# Patient Record
Sex: MALE | HISPANIC OR LATINO | Employment: OTHER | URBAN - METROPOLITAN AREA
[De-identification: names, ages, dates, MRNs, and addresses within clinical notes are randomized per-mention and may not be internally consistent; named-entity substitution may affect disease eponyms.]

---

## 2022-05-11 ENCOUNTER — LAB (OUTPATIENT)
Dept: LAB | Facility: HOSPITAL | Age: 62
End: 2022-05-11

## 2022-05-11 ENCOUNTER — OFFICE VISIT (OUTPATIENT)
Dept: INTERNAL MEDICINE | Facility: CLINIC | Age: 62
End: 2022-05-11

## 2022-05-11 VITALS
OXYGEN SATURATION: 98 % | SYSTOLIC BLOOD PRESSURE: 146 MMHG | HEIGHT: 66 IN | DIASTOLIC BLOOD PRESSURE: 86 MMHG | HEART RATE: 90 BPM | WEIGHT: 166.2 LBS | TEMPERATURE: 98 F | BODY MASS INDEX: 26.71 KG/M2

## 2022-05-11 DIAGNOSIS — F41.1 GENERALIZED ANXIETY DISORDER: ICD-10-CM

## 2022-05-11 DIAGNOSIS — R19.4 CHANGE IN BOWEL HABITS: ICD-10-CM

## 2022-05-11 DIAGNOSIS — Z12.5 SCREENING FOR PROSTATE CANCER: ICD-10-CM

## 2022-05-11 DIAGNOSIS — R10.13 EPIGASTRIC PAIN: ICD-10-CM

## 2022-05-11 DIAGNOSIS — Z11.59 ENCOUNTER FOR HEPATITIS C SCREENING TEST FOR LOW RISK PATIENT: ICD-10-CM

## 2022-05-11 DIAGNOSIS — Z86.19 HISTORY OF HELICOBACTER PYLORI INFECTION: ICD-10-CM

## 2022-05-11 DIAGNOSIS — K21.9 GASTROESOPHAGEAL REFLUX DISEASE WITHOUT ESOPHAGITIS: Primary | ICD-10-CM

## 2022-05-11 DIAGNOSIS — R10.32 LEFT LOWER QUADRANT PAIN: ICD-10-CM

## 2022-05-11 LAB
ALBUMIN SERPL-MCNC: 4.5 G/DL (ref 3.5–5.2)
ALBUMIN/GLOB SERPL: 1.8 G/DL
ALP SERPL-CCNC: 51 U/L (ref 39–117)
ALT SERPL W P-5'-P-CCNC: 21 U/L (ref 1–41)
ANION GAP SERPL CALCULATED.3IONS-SCNC: 12.1 MMOL/L (ref 5–15)
AST SERPL-CCNC: 24 U/L (ref 1–40)
BASOPHILS # BLD AUTO: 0.04 10*3/MM3 (ref 0–0.2)
BASOPHILS NFR BLD AUTO: 0.8 % (ref 0–1.5)
BILIRUB SERPL-MCNC: 0.8 MG/DL (ref 0–1.2)
BUN SERPL-MCNC: 12 MG/DL (ref 8–23)
BUN/CREAT SERPL: 13.2 (ref 7–25)
CALCIUM SPEC-SCNC: 9.4 MG/DL (ref 8.6–10.5)
CHLORIDE SERPL-SCNC: 105 MMOL/L (ref 98–107)
CO2 SERPL-SCNC: 23.9 MMOL/L (ref 22–29)
CREAT SERPL-MCNC: 0.91 MG/DL (ref 0.76–1.27)
DEPRECATED RDW RBC AUTO: 42.6 FL (ref 37–54)
EGFRCR SERPLBLD CKD-EPI 2021: 94.7 ML/MIN/1.73
EOSINOPHIL # BLD AUTO: 0.39 10*3/MM3 (ref 0–0.4)
EOSINOPHIL NFR BLD AUTO: 7.6 % (ref 0.3–6.2)
ERYTHROCYTE [DISTWIDTH] IN BLOOD BY AUTOMATED COUNT: 13.1 % (ref 12.3–15.4)
GLOBULIN UR ELPH-MCNC: 2.5 GM/DL
GLUCOSE SERPL-MCNC: 86 MG/DL (ref 65–99)
HCT VFR BLD AUTO: 47.9 % (ref 37.5–51)
HCV AB SER DONR QL: NORMAL
HGB BLD-MCNC: 16.2 G/DL (ref 13–17.7)
IMM GRANULOCYTES # BLD AUTO: 0.01 10*3/MM3 (ref 0–0.05)
IMM GRANULOCYTES NFR BLD AUTO: 0.2 % (ref 0–0.5)
LYMPHOCYTES # BLD AUTO: 1.96 10*3/MM3 (ref 0.7–3.1)
LYMPHOCYTES NFR BLD AUTO: 38.2 % (ref 19.6–45.3)
MCH RBC QN AUTO: 30.2 PG (ref 26.6–33)
MCHC RBC AUTO-ENTMCNC: 33.8 G/DL (ref 31.5–35.7)
MCV RBC AUTO: 89.4 FL (ref 79–97)
MONOCYTES # BLD AUTO: 0.49 10*3/MM3 (ref 0.1–0.9)
MONOCYTES NFR BLD AUTO: 9.6 % (ref 5–12)
NEUTROPHILS NFR BLD AUTO: 2.24 10*3/MM3 (ref 1.7–7)
NEUTROPHILS NFR BLD AUTO: 43.6 % (ref 42.7–76)
NRBC BLD AUTO-RTO: 0 /100 WBC (ref 0–0.2)
PLATELET # BLD AUTO: 198 10*3/MM3 (ref 140–450)
PMV BLD AUTO: 10 FL (ref 6–12)
POTASSIUM SERPL-SCNC: 4.2 MMOL/L (ref 3.5–5.2)
PROT SERPL-MCNC: 7 G/DL (ref 6–8.5)
PSA SERPL-MCNC: 5.01 NG/ML (ref 0–4)
RBC # BLD AUTO: 5.36 10*6/MM3 (ref 4.14–5.8)
SODIUM SERPL-SCNC: 141 MMOL/L (ref 136–145)
WBC NRBC COR # BLD: 5.13 10*3/MM3 (ref 3.4–10.8)

## 2022-05-11 PROCEDURE — 80053 COMPREHEN METABOLIC PANEL: CPT | Performed by: NURSE PRACTITIONER

## 2022-05-11 PROCEDURE — G0103 PSA SCREENING: HCPCS | Performed by: NURSE PRACTITIONER

## 2022-05-11 PROCEDURE — 36415 COLL VENOUS BLD VENIPUNCTURE: CPT | Performed by: NURSE PRACTITIONER

## 2022-05-11 PROCEDURE — 86803 HEPATITIS C AB TEST: CPT | Performed by: NURSE PRACTITIONER

## 2022-05-11 PROCEDURE — 85025 COMPLETE CBC W/AUTO DIFF WBC: CPT | Performed by: NURSE PRACTITIONER

## 2022-05-11 PROCEDURE — 99204 OFFICE O/P NEW MOD 45 MIN: CPT | Performed by: NURSE PRACTITIONER

## 2022-05-11 PROCEDURE — 83013 H PYLORI (C-13) BREATH: CPT | Performed by: NURSE PRACTITIONER

## 2022-05-11 RX ORDER — CLONAZEPAM 1 MG/1
1 TABLET ORAL DAILY
Qty: 30 TABLET | Refills: 0 | Status: SHIPPED | OUTPATIENT
Start: 2022-05-11 | End: 2022-06-20 | Stop reason: SDUPTHER

## 2022-05-11 RX ORDER — OMEPRAZOLE 20 MG/1
20 CAPSULE, DELAYED RELEASE ORAL DAILY
Qty: 30 CAPSULE | Refills: 3 | Status: SHIPPED | OUTPATIENT
Start: 2022-05-11 | End: 2022-05-20

## 2022-05-11 RX ORDER — CLONAZEPAM 1 MG/1
1 TABLET ORAL 2 TIMES DAILY PRN
COMMUNITY
End: 2022-05-11 | Stop reason: SDUPTHER

## 2022-05-11 NOTE — PROGRESS NOTES
"Subjective   Chief Complaint   Patient presents with   • Establish Care   • Heartburn       Jozef Dillon is a 61 y.o. male here today as a new patient to establish care.  Recently moved to [place] from [place].  Pt states that he has not had a PCP for a long time.  Pt is currently on Clonazepam.  He takes it BID.  He states he's been on the medication for about 2 years.  He feels really anxious.  He states he tried a different medication (he thinks for depression) but it messed up his blood pressure so he was changed to Clonazepam.  He was seeing a psychiatrist in [place].  Pt complains of abdominal pain and acid reflux.  This has been ongoing for about 3 months.  He states this occurs \"all the time\", worse with eating.  He wakes up with the acid in his stomach during the night.  He has tried OTC antacids.  Sometimes he vomits due to the acid.  He states his stomach swells.  He was treated for H. Pylori about 5 months ago.  He states he took treatment for about 6 weeks.  He has never seen GI.  He's had the symptoms ever since he was treated for H. Pylori.  The abdominal pain is in his left lower quadrant and is constant, the epigastric pain occurs only with eating.    Review of Systems   Constitutional: Negative for activity change, appetite change and fatigue.   HENT: Negative for congestion.    Respiratory: Negative for cough and shortness of breath.    Cardiovascular: Negative for chest pain and leg swelling.   Gastrointestinal: Positive for abdominal pain, nausea and GERD. Negative for blood in stool, constipation and diarrhea.   Neurological: Negative for dizziness, weakness and confusion.   Psychiatric/Behavioral: Negative for behavioral problems and decreased concentration.       Past Medical History:   Diagnosis Date   • Anxiety    • Arthritis    • Cancer (HCC)    • Depression    • Gallstone    • GERD (gastroesophageal reflux disease)    • Glaucoma    • Peptic ulceration    • Skin cancer      Past Surgical " "History:   Procedure Laterality Date   • CHOLECYSTECTOMY       Family History   Problem Relation Age of Onset   • Cancer Father      Social History     Tobacco Use   Smoking Status Current Every Day Smoker   • Packs/day: 0.25   • Years: 10.00   • Pack years: 2.50   Smokeless Tobacco Never Used      Social History     Substance and Sexual Activity   Alcohol Use Not Currently      Current Outpatient Medications on File Prior to Visit   Medication Sig   • [DISCONTINUED] clonazePAM (KlonoPIN) 1 MG tablet Take 1 mg by mouth 2 (Two) Times a Day As Needed.     No current facility-administered medications on file prior to visit.     Allergies   Allergen Reactions   • Codeine GI Intolerance       Objective   Vitals:    05/11/22 0953   BP: 146/86   Pulse: 90   Temp: 98 °F (36.7 °C)   TempSrc: Temporal   SpO2: 98%   Weight: 75.4 kg (166 lb 3.2 oz)   Height: 167.6 cm (66\")     Body mass index is 26.83 kg/m².    Physical Exam  Vitals and nursing note reviewed.   HENT:      Head: Normocephalic.   Eyes:      Pupils: Pupils are equal, round, and reactive to light.   Cardiovascular:      Rate and Rhythm: Normal rate and regular rhythm.      Pulses: Normal pulses.      Heart sounds: Normal heart sounds.   Pulmonary:      Effort: Pulmonary effort is normal.      Breath sounds: Normal breath sounds.   Abdominal:      General: Bowel sounds are increased. There is no distension.      Palpations: Abdomen is soft.      Tenderness: There is abdominal tenderness in the left lower quadrant. There is no guarding or rebound.   Skin:     General: Skin is warm and dry.      Capillary Refill: Capillary refill takes less than 2 seconds.   Neurological:      General: No focal deficit present.      Mental Status: He is alert and oriented to person, place, and time.      Gait: Gait is intact.   Psychiatric:         Attention and Perception: Attention normal.         Mood and Affect: Mood normal.         Behavior: Behavior normal.         Thought " Content: Thought content normal.         Judgment: Judgment normal.         Assessment & Plan   Problem List Items Addressed This Visit    None     Visit Diagnoses     Gastroesophageal reflux disease without esophagitis    -  Primary    Relevant Medications    omeprazole (priLOSEC) 20 MG capsule    Other Relevant Orders    CBC w AUTO Differential    Comprehensive Metabolic Panel    H. Pylori Breath Test - Breath, Lung    Epigastric pain        Relevant Orders    H. Pylori Breath Test - Breath, Lung    History of Helicobacter pylori infection        Encounter for hepatitis C screening test for low risk patient        Relevant Orders    Hepatitis C Antibody    Screening for prostate cancer        Relevant Orders    PSA Screen    Change in bowel habits        Generalized anxiety disorder        Relevant Medications    clonazePAM (KlonoPIN) 1 MG tablet    Other Relevant Orders    Ambulatory Referral to Behavioral Health (Completed)    Left lower quadrant pain             Decrease Clonazepam to once daily, discussed with pt psych likely would want him to taper off, he is in agreement  Refer to psych per pt request for anxiety  Labs and check for H/ Pylori  Will need referral to GI for EGD/colonoscopy after lab results reviewed    Current Outpatient Medications:   •  clonazePAM (KlonoPIN) 1 MG tablet, Take 1 tablet by mouth Daily., Disp: 30 tablet, Rfl: 0  •  omeprazole (priLOSEC) 20 MG capsule, Take 1 capsule by mouth Daily., Disp: 30 capsule, Rfl: 3       Plan of care reviewed with the patient at the conclusion of today's visit.  Education was provided regarding diagnosis, management, and any prescribed or recommended OTC medications.  Patient verbalized understanding of and agreement with management plan.     Return in about 3 weeks (around 6/1/2022) for Recheck Acid Reflux .      DENZEL Dejesus

## 2022-05-12 LAB — UREA BREATH TEST QL: NEGATIVE

## 2022-05-13 ENCOUNTER — TELEPHONE (OUTPATIENT)
Dept: INTERNAL MEDICINE | Facility: CLINIC | Age: 62
End: 2022-05-13

## 2022-05-13 DIAGNOSIS — Z12.11 SCREENING FOR COLON CANCER: ICD-10-CM

## 2022-05-13 DIAGNOSIS — R10.13 EPIGASTRIC PAIN: ICD-10-CM

## 2022-05-13 DIAGNOSIS — K21.9 GASTROESOPHAGEAL REFLUX DISEASE WITHOUT ESOPHAGITIS: Primary | ICD-10-CM

## 2022-05-13 DIAGNOSIS — R19.4 CHANGE IN BOWEL HABITS: ICD-10-CM

## 2022-05-20 ENCOUNTER — OFFICE VISIT (OUTPATIENT)
Dept: GASTROENTEROLOGY | Facility: CLINIC | Age: 62
End: 2022-05-20

## 2022-05-20 VITALS — BODY MASS INDEX: 26.52 KG/M2 | WEIGHT: 165 LBS | TEMPERATURE: 97.7 F | HEIGHT: 66 IN

## 2022-05-20 DIAGNOSIS — R11.0 NAUSEA: ICD-10-CM

## 2022-05-20 DIAGNOSIS — Z01.812 ENCOUNTER FOR PREPROCEDURE SCREENING LABORATORY TESTING FOR COVID-19: Primary | ICD-10-CM

## 2022-05-20 DIAGNOSIS — R10.13 EPIGASTRIC PAIN: Primary | ICD-10-CM

## 2022-05-20 DIAGNOSIS — Z20.822 ENCOUNTER FOR PREPROCEDURE SCREENING LABORATORY TESTING FOR COVID-19: Primary | ICD-10-CM

## 2022-05-20 PROCEDURE — 99214 OFFICE O/P EST MOD 30 MIN: CPT | Performed by: NURSE PRACTITIONER

## 2022-05-20 RX ORDER — OMEPRAZOLE 40 MG/1
40 CAPSULE, DELAYED RELEASE ORAL DAILY
Qty: 90 CAPSULE | Refills: 3 | Status: SHIPPED | OUTPATIENT
Start: 2022-05-20 | End: 2022-06-30 | Stop reason: SDUPTHER

## 2022-05-20 RX ORDER — SODIUM, POTASSIUM,MAG SULFATES 17.5-3.13G
2 SOLUTION, RECONSTITUTED, ORAL ORAL TAKE AS DIRECTED
Qty: 354 ML | Refills: 0 | Status: SHIPPED | OUTPATIENT
Start: 2022-05-20 | End: 2022-05-25

## 2022-05-20 NOTE — PROGRESS NOTES
GASTROENTEROLOGY OFFICE NOTE  Jozef Sorto  [id number]  1960    CARE TEAM  Patient Care Team:  Sachin Fuentes APRN as PCP - General (Family Medicine)    Referring Provider: Sachin Fuentes APRN    Chief Complaint   Patient presents with   • Abdominal Pain   • Nausea   • Heartburn   • Diarrhea        HISTORY OF PRESENT ILLNESS:  Patient is a 61-year-old male who presents today with complaints of abdominal pain and epigastric pain described as a burning and rumbling type feeling.  This pain is worse with any type of spicy or citric foods.  He has frequent nausea, usually in the morning when he wakes up is the worst time for his nausea.  He only has vomiting after eating spicy foods.  He also has some loose stools associated eating spicy foods.    His symptoms began back in January.  He was diagnosed with H. pylori and treated.  Recent breath test confirms eradication of H. pylori.  He reports that while he was on treatment for H. pylori his symptoms were somewhat lessened.  After completion of treatment his symptoms worsened, he was started on omeprazole 20 mg daily for 6 weeks.  Again, his symptoms improved somewhat but never completely resolved and since discontinuing the omeprazole his symptoms have worsened.  He resumed Prilosec 20 mg daily about a week ago and notes maybe the smallest amount of improvement.    He has a family history of colon cancer in his father.  He has never had a colonoscopy.  He is scheduled for a colonoscopy with Dr. Alicia on July 18.    PAST MEDICAL HISTORY  Past Medical History:   Diagnosis Date   • Anxiety    • Arthritis    • Cancer (HCC)    • Depression    • Gallstone    • GERD (gastroesophageal reflux disease)    • Glaucoma    • Peptic ulceration    • Skin cancer         PAST SURGICAL HISTORY  Past Surgical History:   Procedure Laterality Date   • CHOLECYSTECTOMY          MEDICATIONS:    Current Outpatient Medications:   •  clonazePAM (KlonoPIN) 1 MG tablet, Take 1  "tablet by mouth Daily., Disp: 30 tablet, Rfl: 0  •  omeprazole (priLOSEC) 40 MG capsule, Take 1 capsule by mouth Daily., Disp: 90 capsule, Rfl: 3    ALLERGIES  Allergies   Allergen Reactions   • Codeine GI Intolerance       FAMILY HISTORY:  Family History   Problem Relation Age of Onset   • Colon cancer Father    • Cancer Father        SOCIAL HISTORY  Social History     Socioeconomic History   • Marital status:    Tobacco Use   • Smoking status: Current Every Day Smoker     Packs/day: 0.25     Years: 10.00     Pack years: 2.50   • Smokeless tobacco: Never Used   Vaping Use   • Vaping Use: Never used   Substance and Sexual Activity   • Alcohol use: Not Currently   • Drug use: Never   • Sexual activity: Defer         PHYSICAL EXAM   Temp 97.7 °F (36.5 °C) (Temporal)   Ht 167.6 cm (66\")   Wt 74.8 kg (165 lb)   BMI 26.63 kg/m²   Physical Exam  Vitals and nursing note reviewed.   Constitutional:       Appearance: Normal appearance. He is well-developed.   HENT:      Head: Normocephalic and atraumatic.      Nose: Nose normal.      Mouth/Throat:      Mouth: Mucous membranes are moist.      Pharynx: Oropharynx is clear.   Eyes:      Pupils: Pupils are equal, round, and reactive to light.   Cardiovascular:      Rate and Rhythm: Normal rate and regular rhythm.   Pulmonary:      Effort: Pulmonary effort is normal.      Breath sounds: Normal breath sounds. No wheezing or rales.   Abdominal:      General: Bowel sounds are normal.      Palpations: Abdomen is soft. There is no mass.      Tenderness: There is abdominal tenderness in the epigastric area and left upper quadrant. There is no guarding or rebound.      Hernia: No hernia is present.   Musculoskeletal:         General: Normal range of motion.      Cervical back: Normal range of motion and neck supple.   Skin:     General: Skin is warm and dry.   Neurological:      Mental Status: He is alert and oriented to person, place, and time.      Cranial Nerves: No cranial " nerve deficit.   Psychiatric:         Behavior: Behavior normal.         Judgment: Judgment normal.           Results Review:  H. pylori Breath Test   Negative Negative          ASSESSMENT / PLAN  1.  Epigastric pain  2.  Nausea  - Increase Prilosec to 40 mg daily  - EGD    Return for Follow up after procedures.    I discussed the patients findings and my recommendations with patient    DENZEL Bailey

## 2022-05-25 RX ORDER — PEG-3350, SODIUM SULFATE, SODIUM CHLORIDE, POTASSIUM CHLORIDE, SODIUM ASCORBATE AND ASCORBIC ACID 7.5-2.691G
KIT ORAL
Qty: 1 EACH | Refills: 0 | Status: SHIPPED | OUTPATIENT
Start: 2022-05-25 | End: 2022-07-01

## 2022-05-27 ENCOUNTER — LAB (OUTPATIENT)
Dept: LAB | Facility: HOSPITAL | Age: 62
End: 2022-05-27

## 2022-05-27 ENCOUNTER — OFFICE VISIT (OUTPATIENT)
Dept: INTERNAL MEDICINE | Facility: CLINIC | Age: 62
End: 2022-05-27

## 2022-05-27 VITALS
HEIGHT: 66 IN | DIASTOLIC BLOOD PRESSURE: 92 MMHG | TEMPERATURE: 97.6 F | SYSTOLIC BLOOD PRESSURE: 144 MMHG | OXYGEN SATURATION: 98 % | WEIGHT: 164.8 LBS | HEART RATE: 91 BPM | BODY MASS INDEX: 26.48 KG/M2

## 2022-05-27 DIAGNOSIS — R35.0 URINARY FREQUENCY: ICD-10-CM

## 2022-05-27 DIAGNOSIS — R97.20 ELEVATED PSA: ICD-10-CM

## 2022-05-27 DIAGNOSIS — I10 ESSENTIAL HYPERTENSION: ICD-10-CM

## 2022-05-27 DIAGNOSIS — K21.9 GASTROESOPHAGEAL REFLUX DISEASE WITHOUT ESOPHAGITIS: Primary | ICD-10-CM

## 2022-05-27 LAB
CHOLEST SERPL-MCNC: 155 MG/DL (ref 0–200)
HDLC SERPL-MCNC: 49 MG/DL (ref 40–60)
LDLC SERPL CALC-MCNC: 87 MG/DL (ref 0–100)
LDLC/HDLC SERPL: 1.73 {RATIO}
PSA SERPL-MCNC: 4.61 NG/ML (ref 0–4)
TRIGL SERPL-MCNC: 106 MG/DL (ref 0–150)
VLDLC SERPL-MCNC: 19 MG/DL (ref 5–40)

## 2022-05-27 PROCEDURE — 99214 OFFICE O/P EST MOD 30 MIN: CPT | Performed by: NURSE PRACTITIONER

## 2022-05-27 PROCEDURE — 36415 COLL VENOUS BLD VENIPUNCTURE: CPT | Performed by: NURSE PRACTITIONER

## 2022-05-27 PROCEDURE — 80061 LIPID PANEL: CPT | Performed by: NURSE PRACTITIONER

## 2022-05-27 PROCEDURE — 84153 ASSAY OF PSA TOTAL: CPT | Performed by: NURSE PRACTITIONER

## 2022-05-27 RX ORDER — LISINOPRIL 10 MG/1
10 TABLET ORAL DAILY
Qty: 30 TABLET | Refills: 5 | Status: SHIPPED | OUTPATIENT
Start: 2022-05-27 | End: 2022-07-08

## 2022-05-27 NOTE — PROGRESS NOTES
"Chief Complaint   Patient presents with   • Heartburn     F/u       HPI  Jozef Sorto is a 61 y.o. male presents for follow-up on heartburn.  Current treatment includes Omeprazole.  Pt was tested for H. Pylori at his last appt which is negative.  He has been referred to GI for an EGD.  He has already seen GI and his Omeprazole was increased and he's feeling some better.  He is scheduled for scopes next month.    BP elevated today.  It was noted to be elevated at his last appt as well.  Pt states his BP is always a little elevated.    The following portions of the patient's history were reviewed and updated as appropriate: allergies, current medications, past family history, past medical history, past social history, past surgical history and problem list.    Subjective  Review of Systems   Constitutional: Negative for activity change, appetite change and fatigue.   HENT: Negative for congestion.    Respiratory: Negative for cough and shortness of breath.    Cardiovascular: Negative for chest pain, palpitations and leg swelling.   Gastrointestinal: Negative for abdominal pain, nausea, vomiting and GERD.   Neurological: Negative for dizziness, weakness and confusion.   Psychiatric/Behavioral: Negative for behavioral problems and decreased concentration.       Objective  Visit Vitals  /92   Pulse 91   Temp 97.6 °F (36.4 °C) (Temporal)   Ht 167.6 cm (66\")   Wt 74.8 kg (164 lb 12.8 oz)   SpO2 98%   BMI 26.60 kg/m²        Physical Exam  Vitals and nursing note reviewed.   HENT:      Head: Normocephalic.   Eyes:      Pupils: Pupils are equal, round, and reactive to light.   Cardiovascular:      Rate and Rhythm: Normal rate and regular rhythm.      Pulses: Normal pulses.      Heart sounds: Normal heart sounds. No murmur heard.  Pulmonary:      Effort: Pulmonary effort is normal.      Breath sounds: Normal breath sounds.   Skin:     General: Skin is warm and dry.      Capillary Refill: Capillary refill takes less than " 2 seconds.   Neurological:      General: No focal deficit present.      Mental Status: He is alert and oriented to person, place, and time.      Gait: Gait is intact.   Psychiatric:         Attention and Perception: Attention normal.         Mood and Affect: Mood normal.         Behavior: Behavior normal.          Procedures       Office Visit on 05/11/2022   Component Date Value Ref Range Status   • Glucose 05/11/2022 86  65 - 99 mg/dL Final   • BUN 05/11/2022 12  8 - 23 mg/dL Final   • Creatinine 05/11/2022 0.91  0.76 - 1.27 mg/dL Final   • Sodium 05/11/2022 141  136 - 145 mmol/L Final   • Potassium 05/11/2022 4.2  3.5 - 5.2 mmol/L Final   • Chloride 05/11/2022 105  98 - 107 mmol/L Final   • CO2 05/11/2022 23.9  22.0 - 29.0 mmol/L Final   • Calcium 05/11/2022 9.4  8.6 - 10.5 mg/dL Final   • Total Protein 05/11/2022 7.0  6.0 - 8.5 g/dL Final   • Albumin 05/11/2022 4.50  3.50 - 5.20 g/dL Final   • ALT (SGPT) 05/11/2022 21  1 - 41 U/L Final   • AST (SGOT) 05/11/2022 24  1 - 40 U/L Final   • Alkaline Phosphatase 05/11/2022 51  39 - 117 U/L Final   • Total Bilirubin 05/11/2022 0.8  0.0 - 1.2 mg/dL Final   • Globulin 05/11/2022 2.5  gm/dL Final   • A/G Ratio 05/11/2022 1.8  g/dL Final   • BUN/Creatinine Ratio 05/11/2022 13.2  7.0 - 25.0 Final   • Anion Gap 05/11/2022 12.1  5.0 - 15.0 mmol/L Final   • eGFR 05/11/2022 94.7  >60.0 mL/min/1.73 Final    National Kidney Foundation and American Society of Nephrology (ASN) Task Force recommended calculation based on the Chronic Kidney Disease Epidemiology Collaboration (CKD-EPI) equation refit without adjustment for race.   • H. pylori Breath Test 05/11/2022 Negative  Negative Final   • Hepatitis C Ab 05/11/2022 Non-Reactive  Non-Reactive Final   • PSA 05/11/2022 5.010 (A) 0.000 - 4.000 ng/mL Final   • WBC 05/11/2022 5.13  3.40 - 10.80 10*3/mm3 Final   • RBC 05/11/2022 5.36  4.14 - 5.80 10*6/mm3 Final   • Hemoglobin 05/11/2022 16.2  13.0 - 17.7 g/dL Final   • Hematocrit  05/11/2022 47.9  37.5 - 51.0 % Final   • MCV 05/11/2022 89.4  79.0 - 97.0 fL Final   • MCH 05/11/2022 30.2  26.6 - 33.0 pg Final   • MCHC 05/11/2022 33.8  31.5 - 35.7 g/dL Final   • RDW 05/11/2022 13.1  12.3 - 15.4 % Final   • RDW-SD 05/11/2022 42.6  37.0 - 54.0 fl Final   • MPV 05/11/2022 10.0  6.0 - 12.0 fL Final   • Platelets 05/11/2022 198  140 - 450 10*3/mm3 Final   • Neutrophil % 05/11/2022 43.6  42.7 - 76.0 % Final   • Lymphocyte % 05/11/2022 38.2  19.6 - 45.3 % Final   • Monocyte % 05/11/2022 9.6  5.0 - 12.0 % Final   • Eosinophil % 05/11/2022 7.6 (A) 0.3 - 6.2 % Final   • Basophil % 05/11/2022 0.8  0.0 - 1.5 % Final   • Immature Grans % 05/11/2022 0.2  0.0 - 0.5 % Final   • Neutrophils, Absolute 05/11/2022 2.24  1.70 - 7.00 10*3/mm3 Final   • Lymphocytes, Absolute 05/11/2022 1.96  0.70 - 3.10 10*3/mm3 Final   • Monocytes, Absolute 05/11/2022 0.49  0.10 - 0.90 10*3/mm3 Final   • Eosinophils, Absolute 05/11/2022 0.39  0.00 - 0.40 10*3/mm3 Final   • Basophils, Absolute 05/11/2022 0.04  0.00 - 0.20 10*3/mm3 Final   • Immature Grans, Absolute 05/11/2022 0.01  0.00 - 0.05 10*3/mm3 Final   • nRBC 05/11/2022 0.0  0.0 - 0.2 /100 WBC Final         Assessment and Plan  Diagnoses and all orders for this visit:    1. Gastroesophageal reflux disease without esophagitis (Primary)    2. Elevated PSA  -     PSA DIAGNOSTIC ONLY; Future  -     Ambulatory Referral to Urology  -     PSA DIAGNOSTIC ONLY    3. Urinary frequency  -     Ambulatory Referral to Urology    4. Essential hypertension  -     Lipid Panel  -     lisinopril (PRINIVIL,ZESTRIL) 10 MG tablet; Take 1 tablet by mouth Daily.  Dispense: 30 tablet; Refill: 5    GERD improved with increase in Omeprazole per GI  Keep appts for scopes next month  Recheck PSA, refer to urology per pt request  Start Lisinopril for BP    Return in about 6 weeks (around 7/8/2022) for Recheck HTN.         Sachin Fuentes, APRN

## 2022-06-05 ENCOUNTER — CLINICAL SUPPORT NO REQUIREMENTS (OUTPATIENT)
Dept: PREADMISSION TESTING | Facility: HOSPITAL | Age: 62
End: 2022-06-05

## 2022-06-05 DIAGNOSIS — Z20.822 ENCOUNTER FOR PREPROCEDURE SCREENING LABORATORY TESTING FOR COVID-19: ICD-10-CM

## 2022-06-05 DIAGNOSIS — Z01.812 ENCOUNTER FOR PREPROCEDURE SCREENING LABORATORY TESTING FOR COVID-19: ICD-10-CM

## 2022-06-05 LAB — SARS-COV-2 RNA PNL SPEC NAA+PROBE: NOT DETECTED

## 2022-06-05 PROCEDURE — C9803 HOPD COVID-19 SPEC COLLECT: HCPCS

## 2022-06-05 PROCEDURE — U0004 COV-19 TEST NON-CDC HGH THRU: HCPCS

## 2022-06-08 ENCOUNTER — OUTSIDE FACILITY SERVICE (OUTPATIENT)
Dept: GASTROENTEROLOGY | Facility: CLINIC | Age: 62
End: 2022-06-08

## 2022-06-08 PROCEDURE — 88305 TISSUE EXAM BY PATHOLOGIST: CPT | Performed by: INTERNAL MEDICINE

## 2022-06-08 PROCEDURE — 45385 COLONOSCOPY W/LESION REMOVAL: CPT | Performed by: INTERNAL MEDICINE

## 2022-06-08 PROCEDURE — 45380 COLONOSCOPY AND BIOPSY: CPT | Performed by: INTERNAL MEDICINE

## 2022-06-08 PROCEDURE — 43239 EGD BIOPSY SINGLE/MULTIPLE: CPT | Performed by: INTERNAL MEDICINE

## 2022-06-09 ENCOUNTER — LAB REQUISITION (OUTPATIENT)
Dept: LAB | Facility: HOSPITAL | Age: 62
End: 2022-06-09

## 2022-06-09 DIAGNOSIS — K29.70 GASTRITIS, UNSPECIFIED, WITHOUT BLEEDING: ICD-10-CM

## 2022-06-09 DIAGNOSIS — K44.9 DIAPHRAGMATIC HERNIA WITHOUT OBSTRUCTION OR GANGRENE: ICD-10-CM

## 2022-06-09 DIAGNOSIS — R11.2 NAUSEA WITH VOMITING, UNSPECIFIED: ICD-10-CM

## 2022-06-09 DIAGNOSIS — R10.13 EPIGASTRIC PAIN: ICD-10-CM

## 2022-06-10 LAB
CYTO UR: NORMAL
LAB AP CASE REPORT: NORMAL
LAB AP CLINICAL INFORMATION: NORMAL
LAB AP DIAGNOSIS COMMENT: NORMAL
PATH REPORT.FINAL DX SPEC: NORMAL
PATH REPORT.GROSS SPEC: NORMAL

## 2022-06-19 DIAGNOSIS — R10.13 EPIGASTRIC PAIN: Primary | ICD-10-CM

## 2022-06-20 DIAGNOSIS — F41.1 GENERALIZED ANXIETY DISORDER: ICD-10-CM

## 2022-06-20 RX ORDER — CLONAZEPAM 1 MG/1
1 TABLET ORAL DAILY
Qty: 30 TABLET | Refills: 0 | Status: SHIPPED | OUTPATIENT
Start: 2022-06-20 | End: 2022-07-19 | Stop reason: SDUPTHER

## 2022-06-30 ENCOUNTER — OFFICE VISIT (OUTPATIENT)
Dept: GASTROENTEROLOGY | Facility: CLINIC | Age: 62
End: 2022-06-30

## 2022-06-30 VITALS
WEIGHT: 164.2 LBS | BODY MASS INDEX: 26.39 KG/M2 | TEMPERATURE: 97.8 F | HEART RATE: 69 BPM | DIASTOLIC BLOOD PRESSURE: 92 MMHG | SYSTOLIC BLOOD PRESSURE: 152 MMHG | HEIGHT: 66 IN

## 2022-06-30 DIAGNOSIS — K29.00 ACUTE SUPERFICIAL GASTRITIS WITHOUT HEMORRHAGE: ICD-10-CM

## 2022-06-30 DIAGNOSIS — K21.9 GASTROESOPHAGEAL REFLUX DISEASE WITHOUT ESOPHAGITIS: Primary | ICD-10-CM

## 2022-06-30 PROCEDURE — 99213 OFFICE O/P EST LOW 20 MIN: CPT | Performed by: NURSE PRACTITIONER

## 2022-06-30 RX ORDER — OMEPRAZOLE 40 MG/1
40 CAPSULE, DELAYED RELEASE ORAL 2 TIMES DAILY
Qty: 120 CAPSULE | Refills: 0 | Status: SHIPPED | OUTPATIENT
Start: 2022-06-30 | End: 2022-08-29

## 2022-06-30 NOTE — PROGRESS NOTES
GASTROENTEROLOGY OFFICE NOTE  Jozef Sorto  [id number]  1960    CARE TEAM  Patient Care Team:  Sachin Fuentes APRN as PCP - General (Family Medicine)    Referring Provider: Sachin Fuentes APRN    Chief Complaint   Patient presents with   • Abdominal Pain     Some improvement        HISTORY OF PRESENT ILLNESS:  Patient returns in follow-up status post EGD for complaints of abdominal and epigastric pain described as a burning type sensation.  Superficial gastritis was noted grossly on EGD, gastric biopsies showed gastric mucosa with reactive changes, no H. pylori-like organisms were seen.  Biopsies of the duodenum showed small bowel mucosa with normal villous architecture with mildly increased intraepithelial lymphocytes, this can sometimes be seen in mild forms of gluten sensitivity.    Prior to endoscopy he was started on omeprazole 20 mg daily then increase to 40 mg daily.  He has noted significant improvement in his symptoms although he continues to have some burning in his stomach.  He has had 2 episodes of vomiting that has occurred on both occasions after overeating.  He reports he has really cut down on the volume of food that he is eating at each setting and this has helped significantly as well.    Additionally had a screening colonoscopy.  A serrated adenomatous polyp was removed from the ascending colon and hyperplastic polyp from the sigmoid colon.  He has a family history of colon cancer in his father.  Recommendations were made to repeat colonoscopy again in 5 years for screening.    PAST MEDICAL HISTORY  Past Medical History:   Diagnosis Date   • Anxiety    • Arthritis    • Cancer (HCC)    • Depression    • Gallstone    • GERD (gastroesophageal reflux disease)    • Glaucoma    • Peptic ulceration    • Skin cancer         PAST SURGICAL HISTORY  Past Surgical History:   Procedure Laterality Date   • CHOLECYSTECTOMY          MEDICATIONS:    Current Outpatient Medications:   •  clonazePAM  "(KlonoPIN) 1 MG tablet, Take 1 tablet by mouth Daily., Disp: 30 tablet, Rfl: 0  •  lisinopril (PRINIVIL,ZESTRIL) 10 MG tablet, Take 1 tablet by mouth Daily., Disp: 30 tablet, Rfl: 5  •  omeprazole (priLOSEC) 40 MG capsule, Take 1 capsule by mouth 2 (Two) Times a Day for 60 days. Then take 1 capsule daily thereafter, Disp: 120 capsule, Rfl: 0  •  PEG-KCl-NaCl-NaSulf-Na Asc-C (MoviPrep) 100 g reconstituted solution powder, Use as directed by provider for colonoscopy prep, Disp: 1 each, Rfl: 0    ALLERGIES  Allergies   Allergen Reactions   • Codeine GI Intolerance       FAMILY HISTORY:  Family History   Problem Relation Age of Onset   • Colon cancer Father    • Cancer Father        SOCIAL HISTORY  Social History     Socioeconomic History   • Marital status:    Tobacco Use   • Smoking status: Current Every Day Smoker     Packs/day: 0.25     Years: 10.00     Pack years: 2.50   • Smokeless tobacco: Never Used   Vaping Use   • Vaping Use: Never used   Substance and Sexual Activity   • Alcohol use: Not Currently   • Drug use: Never   • Sexual activity: Defer           PHYSICAL EXAM   /92 (BP Location: Left arm, Patient Position: Sitting, Cuff Size: Adult)   Pulse 69   Temp 97.8 °F (36.6 °C) (Temporal)   Ht 167.6 cm (66\")   Wt 74.5 kg (164 lb 3.2 oz)   BMI 26.50 kg/m²   Physical Exam  Constitutional:       Appearance: Normal appearance.   HENT:      Head: Normocephalic and atraumatic.   Pulmonary:      Effort: Pulmonary effort is normal.   Neurological:      Mental Status: He is alert and oriented to person, place, and time.   Psychiatric:         Mood and Affect: Mood normal.         Thought Content: Thought content normal.           Results Review:  Clinical Information    Epigastric pain  Nausea with vomiting, unspecified  Diaphragmatic hernia without obstruction or gangrene  Gastritis, unspecified, without bleeding   Final Diagnosis   1. DUODENUM, BIOPSIES:  Small bowel mucosa with normal villous " architecture and mildly increased intraepithelial lymphocytes.  See comment.  2.   STOMACH, FUNDUS BIOPSIES:  Gastric mucosa with reactive changes.  No Helicobacter pylori-like organisms seen.  Negative for dysplasia or malignancy.  3.   ASCENDING COLON, POLYPECTOMY:  Features suggestive of sessile serrated adenoma.  Negative for cytologic atypia or malignancy.  4.   TRANSVERSE COLON, POLYPECTOMIES:  Single fragment of mucosal tag.  Negative for dysplasia or malignancy.  5.   SIGMOID COLON, POLYPECTOMY AT 30 CM:  Hyperplastic polyp.  Negative for dysplasia or malignancy.  6.   RECTUM, POLYPECTOMIES:  Single fragment of mucosal tag.  Negative for dysplasia or malignancy.  GJK   Electronically signed by Ramy Marie MD on 6/10/2022 at 1641         ASSESSMENT / PLAN  1.  GERD  2.  Gastritis  - Increase omeprazole to 40 mg twice daily for 2 months then resume daily dosing thereafter  - Comprehensive celiac panel  3.  Personal history of adenomatous colon polyps  - Colonoscopy in 5 years for colorectal cancer screening    Return if symptoms worsen or fail to improve.    I discussed the patients findings and my recommendations with patient    DENZEL Bailey

## 2022-07-01 ENCOUNTER — OFFICE VISIT (OUTPATIENT)
Dept: INTERNAL MEDICINE | Facility: CLINIC | Age: 62
End: 2022-07-01

## 2022-07-01 ENCOUNTER — LAB (OUTPATIENT)
Dept: LAB | Facility: HOSPITAL | Age: 62
End: 2022-07-01

## 2022-07-01 VITALS
WEIGHT: 164.8 LBS | DIASTOLIC BLOOD PRESSURE: 80 MMHG | HEIGHT: 66 IN | SYSTOLIC BLOOD PRESSURE: 130 MMHG | OXYGEN SATURATION: 98 % | BODY MASS INDEX: 26.48 KG/M2 | HEART RATE: 78 BPM | TEMPERATURE: 98 F

## 2022-07-01 DIAGNOSIS — K21.9 GASTROESOPHAGEAL REFLUX DISEASE WITHOUT ESOPHAGITIS: ICD-10-CM

## 2022-07-01 DIAGNOSIS — I10 ESSENTIAL HYPERTENSION: Primary | ICD-10-CM

## 2022-07-01 DIAGNOSIS — K44.9 HIATAL HERNIA: ICD-10-CM

## 2022-07-01 DIAGNOSIS — K63.5 POLYP OF COLON, UNSPECIFIED PART OF COLON, UNSPECIFIED TYPE: ICD-10-CM

## 2022-07-01 DIAGNOSIS — F41.1 GENERALIZED ANXIETY DISORDER: ICD-10-CM

## 2022-07-01 PROCEDURE — 86258 DGP ANTIBODY EACH IG CLASS: CPT | Performed by: INTERNAL MEDICINE

## 2022-07-01 PROCEDURE — 86364 TISS TRNSGLTMNASE EA IG CLAS: CPT | Performed by: INTERNAL MEDICINE

## 2022-07-01 PROCEDURE — 82784 ASSAY IGA/IGD/IGG/IGM EACH: CPT | Performed by: INTERNAL MEDICINE

## 2022-07-01 PROCEDURE — 86231 EMA EACH IG CLASS: CPT | Performed by: INTERNAL MEDICINE

## 2022-07-01 PROCEDURE — 99214 OFFICE O/P EST MOD 30 MIN: CPT | Performed by: NURSE PRACTITIONER

## 2022-07-01 PROCEDURE — 36415 COLL VENOUS BLD VENIPUNCTURE: CPT | Performed by: INTERNAL MEDICINE

## 2022-07-01 NOTE — PROGRESS NOTES
"Chief Complaint   Patient presents with   • Hypertension       HPI  Jozef Sorto is a 61 y.o. male presents for follow-up on HTN, GERD, and anxiety.  Pt was started on Lisinopril at his last appt for HTN.  He is tolerating the medication well without side effects.  He Today his BP is well controlled.  GERD still well controlled with Omeprazole.  He is being followed by GI.  He had EGD & colonoscopy done earlier this month.  A small hietal hernia was found and several polyps in his colon.  His Omeprazole was increased to BID.  GI has ordered him a celiac panel which he plans to get done today.  His psych appt was changed from July to August due to a \"scheduling issue\".      The following portions of the patient's history were reviewed and updated as appropriate: allergies, current medications, past family history, past medical history, past social history, past surgical history and problem list.    Subjective  Review of Systems   Constitutional: Negative for fever and unexpected weight loss.   Gastrointestinal: Positive for nausea and vomiting. Negative for constipation and diarrhea.   Genitourinary: Positive for frequency. Negative for dysuria and hematuria.   Musculoskeletal: Positive for arthralgias and myalgias.       Objective  Visit Vitals  /80   Pulse 78   Temp 98 °F (36.7 °C) (Temporal)   Ht 167.6 cm (66\")   Wt 74.8 kg (164 lb 12.8 oz)   SpO2 98%   BMI 26.60 kg/m²        Physical Exam  Vitals and nursing note reviewed.   HENT:      Head: Normocephalic.   Eyes:      Pupils: Pupils are equal, round, and reactive to light.   Cardiovascular:      Rate and Rhythm: Normal rate and regular rhythm.      Pulses: Normal pulses.      Heart sounds: Normal heart sounds. No murmur heard.  Pulmonary:      Effort: Pulmonary effort is normal.      Breath sounds: Normal breath sounds. No wheezing.   Abdominal:      General: There is no distension.      Palpations: Abdomen is soft.      Tenderness: There is no abdominal " tenderness.   Skin:     General: Skin is warm and dry.      Capillary Refill: Capillary refill takes less than 2 seconds.   Neurological:      General: No focal deficit present.      Mental Status: He is alert and oriented to person, place, and time.      Gait: Gait is intact.   Psychiatric:         Attention and Perception: Attention normal.         Mood and Affect: Mood normal.         Behavior: Behavior normal.          Procedures     Assessment and Plan  Diagnoses and all orders for this visit:    1. Essential hypertension (Primary)    2. Generalized anxiety disorder    3. Gastroesophageal reflux disease without esophagitis    4. Hiatal hernia    5. Polyp of colon, unspecified part of colon, unspecified type    BP is perfect today, pt states he has had occasional high readings at home, recommend he check his BP dialy x1 week and call me if several high readings, cont Lisinopril at 10mg for now  Anxiety well controlled with Clonazepam, keep appt with psych in Aug, no refill needed until mid July  GERD well controlled with Omeprazole  EGD/colonoscopy results reviewed  Schedule physical    Return in about 1 week (around 7/8/2022) for Annual.         DENZEL Dejesus

## 2022-07-03 LAB
ENDOMYSIUM IGA SER QL: NEGATIVE
GLIADIN PEPTIDE IGA SER-ACNC: 3 UNITS (ref 0–19)
GLIADIN PEPTIDE IGG SER-ACNC: 2 UNITS (ref 0–19)
IGA SERPL-MCNC: 144 MG/DL (ref 61–437)
TTG IGA SER-ACNC: <2 U/ML (ref 0–3)
TTG IGG SER-ACNC: <2 U/ML (ref 0–5)

## 2022-07-07 ENCOUNTER — OFFICE VISIT (OUTPATIENT)
Dept: UROLOGY | Facility: CLINIC | Age: 62
End: 2022-07-07

## 2022-07-07 ENCOUNTER — PRIOR AUTHORIZATION (OUTPATIENT)
Dept: GASTROENTEROLOGY | Facility: CLINIC | Age: 62
End: 2022-07-07

## 2022-07-07 VITALS
SYSTOLIC BLOOD PRESSURE: 124 MMHG | BODY MASS INDEX: 26.36 KG/M2 | HEIGHT: 66 IN | OXYGEN SATURATION: 96 % | DIASTOLIC BLOOD PRESSURE: 78 MMHG | WEIGHT: 164 LBS | HEART RATE: 73 BPM

## 2022-07-07 DIAGNOSIS — N13.8 BPH WITH OBSTRUCTION/LOWER URINARY TRACT SYMPTOMS: Primary | ICD-10-CM

## 2022-07-07 DIAGNOSIS — R35.0 URINARY FREQUENCY: ICD-10-CM

## 2022-07-07 DIAGNOSIS — R97.20 ELEVATED PSA: ICD-10-CM

## 2022-07-07 DIAGNOSIS — N40.1 BPH WITH OBSTRUCTION/LOWER URINARY TRACT SYMPTOMS: Primary | ICD-10-CM

## 2022-07-07 LAB
BILIRUB BLD-MCNC: NEGATIVE MG/DL
CLARITY, POC: CLEAR
COLOR UR: YELLOW
EXPIRATION DATE: NORMAL
GLUCOSE UR STRIP-MCNC: NEGATIVE MG/DL
KETONES UR QL: NEGATIVE
LEUKOCYTE EST, POC: NEGATIVE
Lab: NORMAL
NITRITE UR-MCNC: NEGATIVE MG/ML
PH UR: 6 [PH] (ref 5–8)
PROT UR STRIP-MCNC: NEGATIVE MG/DL
RBC # UR STRIP: NEGATIVE /UL
SP GR UR: 1.01 (ref 1–1.03)
UROBILINOGEN UR QL: NORMAL

## 2022-07-07 PROCEDURE — 51798 US URINE CAPACITY MEASURE: CPT | Performed by: STUDENT IN AN ORGANIZED HEALTH CARE EDUCATION/TRAINING PROGRAM

## 2022-07-07 PROCEDURE — 99204 OFFICE O/P NEW MOD 45 MIN: CPT | Performed by: STUDENT IN AN ORGANIZED HEALTH CARE EDUCATION/TRAINING PROGRAM

## 2022-07-07 RX ORDER — MAGNESIUM HYDROXIDE 1200 MG/15ML
1 LIQUID ORAL ONCE
Qty: 1 ENEMA | Refills: 0 | Status: SHIPPED | OUTPATIENT
Start: 2022-07-07 | End: 2022-07-07

## 2022-07-07 RX ORDER — CIPROFLOXACIN 500 MG/1
500 TABLET, FILM COATED ORAL 2 TIMES DAILY
Qty: 6 TABLET | Refills: 0 | Status: SHIPPED | OUTPATIENT
Start: 2022-07-11 | End: 2022-07-14

## 2022-07-07 RX ORDER — TAMSULOSIN HYDROCHLORIDE 0.4 MG/1
1 CAPSULE ORAL DAILY
Qty: 30 CAPSULE | Refills: 3 | Status: SHIPPED | OUTPATIENT
Start: 2022-07-07

## 2022-07-07 NOTE — PATIENT INSTRUCTIONS
Given your elevated PSA, I recommend proceeding with prostate biopsy to evaluate for possibility of prostate cancer.     Prostate biopsies are performed with a transrectal ultrasound probe, you will be awake for this procedure.  I recommend you bring along a  if possible on the day of your biopsy.  We will numb up the prostate with local anesthetic prior to biopsy, and 12 total prostate samples will be obtained for pathologic analysis.      The biopsy typically takes less than 10 minutes to perform.    To prevent bloodstream infection (2 to 4% risk), you will need to start taking ciprofloxacin 500 mg twice daily, the day prior to your biopsy.  This was sent to your pharmacy.    An enema the morning prior to your biopsy is recommended to clear out the rectum of stool.    Risks of prostate biopsy include bleeding in the urine for a few days, blood in the stool for your first few bowel movements after biopsy, fever/chills/sepsis (2-4%), and small amounts of blood in the semen that can last for a few weeks in some cases (however this is not harmful to you or your partner).    Please let me know if you have any questions or concerns prior to your biopsy.    Thank you,     Garry Almonte MD  [facility]

## 2022-07-07 NOTE — PROGRESS NOTES
Elevated PSA Office Visit      Patient Name: Jozef Sorto  : 1960   MRN: [id number]     Chief Complaint:  Elevated PSA.    Chief Complaint   Patient presents with   • Elevated PSA       Referring Provider: Sachin Fuentes APRN    History of Present Illness: Jozef Sorto is a 61 y.o. male who presents today with history of elevated PSA.  The patient has a past medical history of hypertension, GERD and anxiety.  Patient was found to have mildly elevated PSA on routine yearly screening with his primary care provider.    The patient is an artist, he is a . He owns his own company now.     Lab Results   Component Value Date    PSA 4.610 (H) 2022    PSA 5.010 (H) 2022        Prostate Biopsy Collaborative Group (PBCG) Risk Calculator:            Patient reports a family history of prostate cancer in his father who  at age 77 from metastatic prostate cancer.  He does not think he received any treatment.    Patient reports some baseline lower urinary tract symptoms, severe symptoms with IPSS 22, QOL score unhappy. Reports his urine stream is somewhat preserved but severe urgency and nocturia 4x nightly.  He may have noticed some reduced strength of stream recently.  PVR 17 mL, UA negative today.    Subjective      Review of System: Review of Systems   Constitutional: Negative for chills, fatigue, fever and unexpected weight change.   HENT: Negative for sore throat.    Eyes: Negative for visual disturbance.   Respiratory: Negative for cough, chest tightness and shortness of breath.    Cardiovascular: Negative for chest pain and leg swelling.   Gastrointestinal: Negative for blood in stool, constipation, diarrhea, nausea, rectal pain and vomiting.   Genitourinary: Positive for frequency and urgency. Negative for decreased urine volume, difficulty urinating, dysuria, enuresis, flank pain, genital sores and hematuria.   Musculoskeletal: Negative for back pain and joint swelling.    Skin: Negative for rash and wound.   Neurological: Negative for seizures, speech difficulty, weakness and headaches.   Psychiatric/Behavioral: Negative for confusion, sleep disturbance and suicidal ideas. The patient is not nervous/anxious.       I have reviewed the ROS documented by my clinical staff, I have updated appropriately and I agree. Garry Almonte MD    Past Medical History:   Past Medical History:   Diagnosis Date   • Anxiety    • Arthritis    • Cancer (HCC)    • Depression    • Gallstone    • GERD (gastroesophageal reflux disease)    • Glaucoma    • Hiatal hernia    • Hypertension    • Peptic ulceration    • Skin cancer        Past Surgical History:   Past Surgical History:   Procedure Laterality Date   • CHOLECYSTECTOMY         Family History:   Family History   Problem Relation Age of Onset   • Colon cancer Father    • Cancer Father        Social History:   Social History     Socioeconomic History   • Marital status:    Tobacco Use   • Smoking status: Current Every Day Smoker     Packs/day: 0.25     Years: 10.00     Pack years: 2.50   • Smokeless tobacco: Never Used   Vaping Use   • Vaping Use: Never used   Substance and Sexual Activity   • Alcohol use: Not Currently   • Drug use: Never   • Sexual activity: Not Currently       Medications:     Current Outpatient Medications:   •  clonazePAM (KlonoPIN) 1 MG tablet, Take 1 tablet by mouth Daily., Disp: 30 tablet, Rfl: 0  •  lisinopril (PRINIVIL,ZESTRIL) 10 MG tablet, Take 1 tablet by mouth Daily., Disp: 30 tablet, Rfl: 5  •  omeprazole (priLOSEC) 40 MG capsule, Take 1 capsule by mouth 2 (Two) Times a Day for 60 days. Then take 1 capsule daily thereafter, Disp: 120 capsule, Rfl: 0  •  [START ON 7/11/2022] ciprofloxacin (Cipro) 500 MG tablet, Take 1 tablet by mouth 2 (Two) Times a Day for 3 days. Start taking the day prior to your prostate biopsy., Disp: 6 tablet, Rfl: 0  •  sodium phosphate (FLEET) 7-19 GM/118ML enema, Insert 1 enema into  the rectum 1 (One) Time for 1 dose. Use morning of your prostate biopsy, Disp: 1 enema, Rfl: 0  •  tamsulosin (FLOMAX) 0.4 MG capsule 24 hr capsule, Take 1 capsule by mouth Daily., Disp: 30 capsule, Rfl: 3    Allergies:   Allergies   Allergen Reactions   • Codeine GI Intolerance       IPSS Questionnaire (AUA-7):  Over the past month…    1)  Incomplete Emptying:       How often have you had a sensation of not emptying you had the sensation of not emptying your bladder completely after you finished urinating?  4 - More than half the time   2)  Frequency:       How often have you had the urinate again less than two hours after you finished urinating?  5 - Almost always   3)  Intermittency:       How often have you found you stopped and started again several times when you urinated?   0 - Not at all   4) Urgency:      How often have you found it difficult to postpone urination?  5 - Almost always   5) Weak Stream:      How often have you had a weak urinary stream?  0 - Not at all   6) Straining:       How often have you had to push or strain to begin urination?  4 - More than half the time   7) Nocturia:      How many times did you most typically get up to urinate from the time you went to bed at night until the time you got up in the morning?  4 - 4 times   Total Score:  22   The International Prostate Symptom Score (IPSS) is used to screen, diagnose, track symptoms of benign prostatic hyperplasia (BPH).   0-7 (Mild Symptoms) 8-19 (Moderate) 20-35 (Severe)   Quality of Life (QoL):  If you were to spend the rest of your life with your urinary condition just the way it is now, how would you feel about that? 5-Unhappy   Urine Leakage (Incontinence) 0-No Leakage       Sexual Health Inventory for Men (LOPEZ)   Over the past 6 months:     1. How do you rate your confidence that you could get and keep an erection?  2 - Low   2. When you had erections with sexual    stimulation, how often were your erections hard enough for  "penetration (entering your partner)?  2 - A few times (much less than half the time)   3. During sexual intercourse, how often were you able to maintain your erection after you had penetrated (entered) your partner?  2 - A few times (much less than half the time)   4. During sexual intercourse, how difficult was it to maintain your erection to completion of intercourse?  2 - Very difficult    5. When you attempted sexual intercourse, how often was it satisfactory for you?  3 - Sometimes (About half the time)     Total Score: 4   The Sexual Health Inventory for Men further classifies ED severity with the following breakpoints:   1-7 (Severe ED) 8-11 (Moderate ED) 12-16 (Mild to Moderate ED) 17-21 (Mild ED)      Post void residual bladder scan:   17 mL       Objective     Physical Exam:   Vital Signs:   Vitals:    07/07/22 0855   BP: 124/78   Pulse: 73   SpO2: 96%   Weight: 74.4 kg (164 lb)   Height: 167.6 cm (66\")     Body mass index is 26.47 kg/m².     Physical Exam  Vitals and nursing note reviewed.   Constitutional:       Appearance: Normal appearance.   HENT:      Head: Normocephalic and atraumatic.      Mouth/Throat:      Mouth: Mucous membranes are moist.      Pharynx: Oropharynx is clear.   Eyes:      Extraocular Movements: Extraocular movements intact.      Conjunctiva/sclera: Conjunctivae normal.   Cardiovascular:      Rate and Rhythm: Normal rate and regular rhythm.   Pulmonary:      Effort: Pulmonary effort is normal. No respiratory distress.   Abdominal:      Palpations: Abdomen is soft.      Tenderness: There is no abdominal tenderness. There is no right CVA tenderness or left CVA tenderness.   Genitourinary:     Comments:  Circumcised phallus, orthotopic meatus, bilaterally descended testicles without masses, or lesions.     SYLVIA: Normal rectal tone, no blood, estimated 60+ gram prostate without nodularity or firmness.   Musculoskeletal:         General: Normal range of motion.      Cervical back: Normal " range of motion.   Skin:     General: Skin is warm and dry.   Neurological:      General: No focal deficit present.      Mental Status: He is alert and oriented to person, place, and time.   Psychiatric:         Mood and Affect: Mood normal.         Behavior: Behavior normal.         Labs:   Hematocrit (%)   Date Value   05/11/2022 47.9       Lab Results   Component Value Date    PSA 4.610 (H) 05/27/2022    PSA 5.010 (H) 05/11/2022       Images:   No Images in the past 120 days found..    Measures:   Tobacco:   Jozef Sorto  reports that he has been smoking. He has a 2.50 pack-year smoking history. He has never used smokeless tobacco.. I have educated him on the risk of diseases from using tobacco products such as cancer, COPD and heart disease.     I advised him to quit and he is not willing to quit.    I spent 3  minutes counseling the patient.            Assessment / Plan      Assessment/Plan:   Mr. Sorto is a 61 y.o. male with elevated PSA.    I had a detailed discussion with the patient today regarding prostate-specific antigen (PSA) and prostate cancer screening.  We discussed that PSA is an imperfect screening test and that it can be elevated for a variety of reasons including infection, inflammation, as a function of the prostate volume, and due to malignancy.  We discussed how prostate cancer is the most commonly diagnosed malignancy among men and becomes more prevalent at advanced age.  We discussed how patients with a family history of prostate cancer are at an increased risk of developing prostate cancer over the general population.  I counseled the patient that the American Urological Association guidelines recommend PSA screening in men aged 55 through 70, or in some instances at an earlier age if positive family history for prostate cancer.  I discussed how screening men older than 70 years old is not recommended, unless a patient has a greater than 10-year life expectancy, is in good health, and  "is personally motivated to rule out prostate cancer; this is due to the fact that most men older than 70, in poor health, and in those men with less than 10-year life expectancy will likely die of unrelated causes not associated with prostate cancer.  We talked about how prostate cancer is typically a slow-growing malignancy, but identifying intermediate or high risk prostate cancers that need prompt treatment is the ultimate goal of PSA and prostate cancer screening.  I discussed with this patient that there is no \"normal\" PSA range despite the fact that most labs indicate a \"normal\" PSA range from 0 to 4 ng/dL.  There are age-adjusted PSA ranges that are also taken into account in the setting of slightly elevated PSA in the older male.  Besides age-adjusted ranges, calculating a patient's prostate volume to determine PSA density (<0.15 has a lower risk of harboring malignancy), calculating the patient's PSA velocity or doubling time, and evaluating free PSA versus total PSA values can help guide our decision making.  I also discussed the possibility of performing adjunctive blood tests as well, my preference is to perform a 4K score which can provide a percentage risk of harboring intermediate or high risk prostate cancers that may need treatment.  This is sometimes beneficial in assisting with decision-making in patients who are hesitant to undergo prostate biopsy.  I also discussed my goal is to work-up the appropriate patient for elevated PSA as prostate biopsy and work-up for prostate cancer has inherent risks and potential harms.  The risk of prostate biopsy related sepsis is 4% for all comers.    In short, I discussed that PSA screening and work-up for prostate cancer should only ever occur after shared decision making conversation between the physician and patient.  Patient reports understanding.    Discussion summary  After thorough discussion, I expressed my concern regarding his father who has a history " of metastatic prostate cancer which places the patient at significant increased risk of an undiagnosed prostate cancer.  Despite his minimally elevated PSA, I believe the best option for this patient given his age and history would be to proceed with a prostate biopsy.  He was given the option of 4K score, upfront MRI or upfront biopsy.  The patient elects for an upfront biopsy via shared decision-making discussion.  We will prescribe him ciprofloxacin 500 mg twice daily to start the day before his biopsy and a fleets enema the morning of his biopsy.  Discussed risks including sepsis, blood in the urine, blood in the stool, blood in the ejaculate etc.    Patient has moderate lower urinary tract symptoms, we will treat empirically with tamsulosin.  Side effects including lightheadedness, hypotension were discussed, return precautions discussed.  If no benefit we will consider anticholinergic or Myrbetriq.      Diagnoses and all orders for this visit:    1. 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  Dispense: 30 capsule; Refill: 3    2. Elevated PSA    - TRUS prostate biopsy next available    -     POC Urinalysis Dipstick, Automated  -     ciprofloxacin (Cipro) 500 MG tablet; Take 1 tablet by mouth 2 (Two) Times a Day for 3 days. Start taking the day prior to your prostate biopsy.  Dispense: 6 tablet; Refill: 0  -     sodium phosphate (FLEET) 7-19 GM/118ML enema; Insert 1 enema into the rectum 1 (One) Time for 1 dose. Use morning of your prostate biopsy  Dispense: 1 enema; Refill: 0          Follow Up:   Return in about 5 days (around 7/12/2022), or 7/12/22 TRUS prostate biopsy in clinic, for 7/12/22 TRUS prostate biopsy in clinic .    I spent approximately 30 minutes providing clinical care for this patient; including review of patient's chart and provider documentation, face to face time spent with patient in  examination room (obtaining history, performing physical exam, discussing diagnosis and management options), placing orders, and completing patient documentation.     Garry Almonte MD  [facility] [place]

## 2022-07-08 ENCOUNTER — TELEPHONE (OUTPATIENT)
Dept: GASTROENTEROLOGY | Facility: CLINIC | Age: 62
End: 2022-07-08

## 2022-07-08 ENCOUNTER — OFFICE VISIT (OUTPATIENT)
Dept: INTERNAL MEDICINE | Facility: CLINIC | Age: 62
End: 2022-07-08

## 2022-07-08 VITALS
HEART RATE: 78 BPM | SYSTOLIC BLOOD PRESSURE: 128 MMHG | DIASTOLIC BLOOD PRESSURE: 84 MMHG | OXYGEN SATURATION: 95 % | TEMPERATURE: 96.9 F | HEIGHT: 66 IN | BODY MASS INDEX: 26.52 KG/M2 | RESPIRATION RATE: 18 BRPM | WEIGHT: 165 LBS

## 2022-07-08 DIAGNOSIS — I10 ESSENTIAL HYPERTENSION: ICD-10-CM

## 2022-07-08 DIAGNOSIS — F17.210 CIGARETTE SMOKER: ICD-10-CM

## 2022-07-08 DIAGNOSIS — F32.A MILD DEPRESSION: ICD-10-CM

## 2022-07-08 DIAGNOSIS — Z00.00 ROUTINE PHYSICAL EXAMINATION: Primary | ICD-10-CM

## 2022-07-08 DIAGNOSIS — Z23 ENCOUNTER FOR VACCINATION: ICD-10-CM

## 2022-07-08 PROCEDURE — G0009 ADMIN PNEUMOCOCCAL VACCINE: HCPCS | Performed by: NURSE PRACTITIONER

## 2022-07-08 PROCEDURE — 90677 PCV20 VACCINE IM: CPT | Performed by: NURSE PRACTITIONER

## 2022-07-08 PROCEDURE — 99396 PREV VISIT EST AGE 40-64: CPT | Performed by: NURSE PRACTITIONER

## 2022-07-08 RX ORDER — LISINOPRIL 20 MG/1
20 TABLET ORAL 2 TIMES DAILY
Qty: 60 TABLET | Refills: 5 | Status: SHIPPED | OUTPATIENT
Start: 2022-07-08

## 2022-07-08 NOTE — PROGRESS NOTES
"Subjective   Chief Complaint   Patient presents with   • Annual Exam        Jozef Sorto is a 61 y.o. male here today for annual exam.  Feeling \"really tired\", maybe depressed    Overall healthy: Yes  Regular exercise:  No  Diet is well balanced:  Yes  Vitamin Supplement:  Yes, recommend he take MVI daily  Alcohol intake:  No   Tobacco use:  Yes, smokes couple cigarettes/day    Cardiovascular risk is low:  Yes   PSA: schedule for prostate biopsy  ED or bedroom issues: No  Concern for STDs:  No  Last colon screening:  Done 2022, needs repeated in 2025  Regular dental exam:  No   Regular eye exam:  Yes  Immunizations up to date:  Yes  Wear a seatbelt regularly:  Yes  Wear sunscreen regularly when outdoors:  Yes    Review of Systems   Constitutional: Positive for fatigue. Negative for activity change and appetite change.   HENT: Negative for congestion.    Respiratory: Negative for cough and shortness of breath.    Cardiovascular: Negative for chest pain and leg swelling.   Gastrointestinal: Negative for abdominal pain.   Neurological: Negative for dizziness, weakness and confusion.   Psychiatric/Behavioral: Positive for depressed mood (very mild, feeling lonely). Negative for behavioral problems and decreased concentration. The patient is nervous/anxious.        The following portions of the patient's history were reviewed and updated as appropriate: allergies, current medications, past family history, past medical history, past social history, past surgical history and problem list.    Past Medical History:   Diagnosis Date   • Anxiety    • Arthritis    • Cancer (HCC)    • Depression    • Gallstone    • GERD (gastroesophageal reflux disease)    • Glaucoma    • Hiatal hernia    • Hypertension    • Peptic ulceration    • Skin cancer      Past Surgical History:   Procedure Laterality Date   • CHOLECYSTECTOMY       Family History   Problem Relation Age of Onset   • Colon cancer Father    • Cancer Father      Social " "History     Tobacco Use   Smoking Status Current Every Day Smoker   • Packs/day: 0.25   • Years: 10.00   • Pack years: 2.50   Smokeless Tobacco Never Used     Social History     Substance and Sexual Activity   Alcohol Use Not Currently     Allergies   Allergen Reactions   • Codeine GI Intolerance       Current Outpatient Medications on File Prior to Visit   Medication Sig   • [START ON 2022] ciprofloxacin (Cipro) 500 MG tablet Take 1 tablet by mouth 2 (Two) Times a Day for 3 days. Start taking the day prior to your prostate biopsy.   • clonazePAM (KlonoPIN) 1 MG tablet Take 1 tablet by mouth Daily.   • omeprazole (priLOSEC) 40 MG capsule Take 1 capsule by mouth 2 (Two) Times a Day for 60 days. Then take 1 capsule daily thereafter   • tamsulosin (FLOMAX) 0.4 MG capsule 24 hr capsule Take 1 capsule by mouth Daily.   • [DISCONTINUED] lisinopril (PRINIVIL,ZESTRIL) 10 MG tablet Take 1 tablet by mouth Daily.   • [] sodium phosphate (FLEET) 7-19 GM/118ML enema Insert 1 enema into the rectum 1 (One) Time for 1 dose. Use morning of your prostate biopsy     No current facility-administered medications on file prior to visit.       Objective   Vitals:    22 1002   BP: 128/84   Pulse: 78   Resp: 18   Temp: 96.9 °F (36.1 °C)   SpO2: 95%   Weight: 74.8 kg (165 lb)   Height: 167.6 cm (66\")     Body mass index is 26.63 kg/m².    Physical Exam  Vitals and nursing note reviewed.   HENT:      Head: Normocephalic.      Right Ear: Tympanic membrane, ear canal and external ear normal.      Left Ear: Tympanic membrane, ear canal and external ear normal.   Eyes:      Pupils: Pupils are equal, round, and reactive to light.   Neck:      Thyroid: No thyroid mass, thyromegaly or thyroid tenderness.      Vascular: No carotid bruit.   Cardiovascular:      Rate and Rhythm: Normal rate and regular rhythm.      Pulses: Normal pulses.           Carotid pulses are 2+ on the right side and 2+ on the left side.       Posterior tibial " pulses are 2+ on the right side and 2+ on the left side.      Heart sounds: Normal heart sounds. No murmur heard.  Pulmonary:      Effort: Pulmonary effort is normal.      Breath sounds: Normal breath sounds.   Abdominal:      General: Bowel sounds are normal. There is no distension.      Palpations: Abdomen is soft.      Tenderness: There is no abdominal tenderness.   Musculoskeletal:      Cervical back: Normal.      Thoracic back: Normal.      Lumbar back: Normal.      Right lower leg: No edema.      Left lower leg: No edema.      Comments: Full ROM of major joints   Lymphadenopathy:      Cervical: No cervical adenopathy.   Skin:     General: Skin is warm and dry.      Capillary Refill: Capillary refill takes less than 2 seconds.   Neurological:      General: No focal deficit present.      Mental Status: He is alert and oriented to person, place, and time.      GCS: GCS eye subscore is 4. GCS verbal subscore is 5. GCS motor subscore is 6.      Motor: Motor function is intact.      Coordination: Coordination is intact.      Gait: Gait is intact.   Psychiatric:         Attention and Perception: Attention normal.         Mood and Affect: Mood normal. Affect is flat.         Behavior: Behavior normal.         Thought Content: Thought content normal.         Cognition and Memory: Cognition normal.         Judgment: Judgment normal.         BMI is >= 25 and <30. (Overweight) The following options were offered after discussion;: exercise counseling/recommendations and nutrition counseling/recommendations       Assessment & Plan     Current Outpatient Medications:   •  [START ON 7/11/2022] ciprofloxacin (Cipro) 500 MG tablet, Take 1 tablet by mouth 2 (Two) Times a Day for 3 days. Start taking the day prior to your prostate biopsy., Disp: 6 tablet, Rfl: 0  •  clonazePAM (KlonoPIN) 1 MG tablet, Take 1 tablet by mouth Daily., Disp: 30 tablet, Rfl: 0  •  lisinopril (PRINIVIL,ZESTRIL) 20 MG tablet, Take 1 tablet by mouth 2  (Two) Times a Day., Disp: 60 tablet, Rfl: 5  •  omeprazole (priLOSEC) 40 MG capsule, Take 1 capsule by mouth 2 (Two) Times a Day for 60 days. Then take 1 capsule daily thereafter, Disp: 120 capsule, Rfl: 0  •  tamsulosin (FLOMAX) 0.4 MG capsule 24 hr capsule, Take 1 capsule by mouth Daily., Disp: 30 capsule, Rfl: 3  •  sertraline (Zoloft) 50 MG tablet, Take 1/2 tablet PO x7 days and then increase to 1 tablet daily, Disp: 30 tablet, Rfl: 1    Problem List Items Addressed This Visit        Cardiac and Vasculature    Essential hypertension    Relevant Medications    lisinopril (PRINIVIL,ZESTRIL) 20 MG tablet      Other Visit Diagnoses     Routine physical examination    -  Primary    Mild depression        Relevant Medications    sertraline (Zoloft) 50 MG tablet    Encounter for vaccination        Relevant Orders    Pneumococcal Conjugate Vaccine 20-Valent (PCV20) (Completed)         PCV20 today  Recommend he get Shingrix at his pharmacy  Recommend daily MVI  Keep appt for prostate biopsy next wk  Colonoscopy done 2022, repeat in 2025  Start Zoloft for depression, d/w pt could take 4-6 weeks to get full effect, pt verb understanding  BP log reviewed, will increase Lisinopril dose and change to BID  Highly encouraged smoking cessation, pt states he has quit in the past and he knows he can on his own, he has never smoked a erendira/day         Counseling was given to patient for the following topics: appropriate exercise, healthy eating habits, disease prevention and importance of self testicular exam.     Plan of care reviewed with the patient at the conclusion of today's visit.  Education was provided regarding diagnosis, management, and any prescribed or recommended OTC medications.  Patient verbalized understanding of and agreement with management plan.     Return in about 6 weeks (around 8/19/2022) for Recheck Depression and HTN.         Sachin Fuentes APRN

## 2022-07-08 NOTE — TELEPHONE ENCOUNTER
----- Message from Jozef Sorto sent at 7/7/2022  7:29 PM EDT -----  Regarding: Question regarding CELIAC REFLEX PANEL  I am concerned about my test results

## 2022-07-12 ENCOUNTER — PROCEDURE VISIT (OUTPATIENT)
Dept: UROLOGY | Facility: CLINIC | Age: 62
End: 2022-07-12

## 2022-07-12 VITALS — HEIGHT: 66 IN | WEIGHT: 165 LBS | BODY MASS INDEX: 26.52 KG/M2

## 2022-07-12 DIAGNOSIS — R97.20 ELEVATED PROSTATE SPECIFIC ANTIGEN (PSA): Primary | ICD-10-CM

## 2022-07-12 PROCEDURE — 76942 ECHO GUIDE FOR BIOPSY: CPT | Performed by: STUDENT IN AN ORGANIZED HEALTH CARE EDUCATION/TRAINING PROGRAM

## 2022-07-12 PROCEDURE — 55700 PR PROSTATE NEEDLE BIOPSY ANY APPROACH: CPT | Performed by: STUDENT IN AN ORGANIZED HEALTH CARE EDUCATION/TRAINING PROGRAM

## 2022-07-12 NOTE — PROGRESS NOTES
Preprocedure diagnosis  Elevated PSA    Postprocedure diagnosis  Elevated PSA    Procedure  Transrectal ultrasound-guided prostate biopsy, local prostate block with 1% lidocaine injection    Attending surgeon  Garry Almonte MD    Anesthesia  1% Lidocaine prostate block    Complications  None    Indications  61 y.o. malewho has a history of elevated PSA who presents today for transrectal ultrasound-guided prostate biopsy after discussion of risks, benefits, alternatives.  Informed consent was obtained.  Patient has taken his ciprofloxacin pre-procedurally and completed an enema the night before his biopsy.    Findings  -Digital rectal examination = Benign, mildly enlarged  -Prostate volume measurements = 41 cc volume  -PSA density = 0.112  -Total number of biopsy cores= 12    Lab Results   Component Value Date    PSA 4.610 (H) 05/27/2022    PSA 5.010 (H) 05/11/2022         Procedure   The patient was positioned and prepped in a left lateral position with lower extremities flexed.       A digital rectal exam was performed identifying a Benign, mildly enlarged gland without nodules or firmness.     The rectal ultrasound probe was slowly introduced into the rectum without difficulty.  The prostate and seminal vesicles were inspected systematically using axial and sagittal views with the ultrasound.      The dimensions of the prostate were measured, for a calculated volume of 41 mL, PSA Density 0.112.      Next 5 cc of 1% lidocaine was injected at the right and left neurovascular bundles at the junction of the seminal vesicles bilaterally.  Next using a true cut biopsy needle, 12 prostate cores were collected. The specific locations were the following: left lateral base, left lateral mid, left lateral apex, left medial base, left medial mid, and left medial apex, right lateral base, right lateral mid, right lateral apex, right medial base, right medial mid, right medial apex, and right and left transition zones.   The rectal ultrasound probe was held in place for 2 minutes for hemostasis.  The rectal ultrasound probe was removed.  A SYLVIA was again performed revealing little blood in the rectum.  The patient tolerated the procedure well.     Disposition/Follow Up:     1.  The patient was instructed to drink plenty of fluids and warned about possible complications and side effects including, but not limited to, blood in the urine, stool and semen as well as bloodstream infection.  He was instructed to call the office if there are any issues, especially fevers or flu-like symptoms.    2.  Continue antibiotic for a total of 3 days.  3.  Call the patient to discuss pathology results and next steps    Garry Almonte MD  [facility] – [facility]

## 2022-07-13 LAB — REF LAB TEST METHOD: NORMAL

## 2022-07-14 ENCOUNTER — TELEPHONE (OUTPATIENT)
Dept: UROLOGY | Facility: CLINIC | Age: 62
End: 2022-07-14

## 2022-07-14 NOTE — TELEPHONE ENCOUNTER
Call patient back with results of his prostate biopsy pathology which indicates likely prostatitis but no evidence of prostate cancer.  Patient reports understanding.  We will continue to monitor the PSA on a 6-month basis for now and backed this out to a year if he has a stable trend.  In regards to his urinary symptoms he has not started his empiric tamsulosin yet.  He was found to have a mild to moderately enlarged prostate.  We discussed bringing him back to clinic in 6 or 8 weeks to determine symptom benefit from tamsulosin, if no improvement I recommend flexible cystoscopy to evaluate for prostatic obstruction which may warrant surgical indications.    PLAN  - return to clinic in 6-8 weeks for follow up regarding LUTS  - call patient to arrange     Garry Almonte MD

## 2022-07-19 DIAGNOSIS — F41.1 GENERALIZED ANXIETY DISORDER: ICD-10-CM

## 2022-07-19 RX ORDER — CLONAZEPAM 1 MG/1
1 TABLET ORAL DAILY
Qty: 30 TABLET | Refills: 0 | Status: SHIPPED | OUTPATIENT
Start: 2022-07-19